# Patient Record
Sex: MALE | Race: BLACK OR AFRICAN AMERICAN | NOT HISPANIC OR LATINO | URBAN - METROPOLITAN AREA
[De-identification: names, ages, dates, MRNs, and addresses within clinical notes are randomized per-mention and may not be internally consistent; named-entity substitution may affect disease eponyms.]

---

## 2023-04-05 ENCOUNTER — ANESTHESIA (OUTPATIENT)
Dept: PERIOP | Facility: HOSPITAL | Age: 48
End: 2023-04-05
Payer: COMMERCIAL

## 2023-04-05 ENCOUNTER — APPOINTMENT (OUTPATIENT)
Dept: CT IMAGING | Facility: HOSPITAL | Age: 48
End: 2023-04-05
Payer: COMMERCIAL

## 2023-04-05 ENCOUNTER — HOSPITAL ENCOUNTER (OUTPATIENT)
Facility: HOSPITAL | Age: 48
Discharge: HOME | End: 2023-04-07
Attending: EMERGENCY MEDICINE | Admitting: SURGERY
Payer: COMMERCIAL

## 2023-04-05 ENCOUNTER — ANESTHESIA EVENT (OUTPATIENT)
Dept: PERIOP | Facility: HOSPITAL | Age: 48
End: 2023-04-05
Payer: COMMERCIAL

## 2023-04-05 DIAGNOSIS — Z90.49 S/P LAPAROSCOPIC APPENDECTOMY: ICD-10-CM

## 2023-04-05 DIAGNOSIS — R11.2 NAUSEA AND VOMITING, UNSPECIFIED VOMITING TYPE: ICD-10-CM

## 2023-04-05 DIAGNOSIS — K35.30 ACUTE APPENDICITIS WITH LOCALIZED PERITONITIS, WITHOUT PERFORATION, ABSCESS, OR GANGRENE: Primary | ICD-10-CM

## 2023-04-05 PROBLEM — K35.80 ACUTE APPENDICITIS: Status: ACTIVE | Noted: 2023-04-05

## 2023-04-05 LAB
ALBUMIN SERPL-MCNC: 4.4 G/DL (ref 3.5–5.2)
ALBUMIN/GLOB SERPL: 1.2 G/DL
ALP SERPL-CCNC: 49 U/L (ref 39–117)
ALT SERPL W P-5'-P-CCNC: 19 U/L (ref 1–41)
ANION GAP SERPL CALCULATED.3IONS-SCNC: 11.1 MMOL/L (ref 5–15)
AST SERPL-CCNC: 23 U/L (ref 1–40)
BACTERIA UR QL AUTO: ABNORMAL /HPF
BASOPHILS # BLD AUTO: 0.03 10*3/MM3 (ref 0–0.2)
BASOPHILS NFR BLD AUTO: 0.1 % (ref 0–1.5)
BILIRUB SERPL-MCNC: 0.8 MG/DL (ref 0–1.2)
BILIRUB UR QL STRIP: NEGATIVE
BUN SERPL-MCNC: 11 MG/DL (ref 6–20)
BUN/CREAT SERPL: 10.1 (ref 7–25)
CALCIUM SPEC-SCNC: 9.3 MG/DL (ref 8.6–10.5)
CHLORIDE SERPL-SCNC: 97 MMOL/L (ref 98–107)
CLARITY UR: CLEAR
CO2 SERPL-SCNC: 25.9 MMOL/L (ref 22–29)
COLOR UR: YELLOW
CREAT SERPL-MCNC: 1.09 MG/DL (ref 0.76–1.27)
DEPRECATED RDW RBC AUTO: 40 FL (ref 37–54)
EGFRCR SERPLBLD CKD-EPI 2021: 84.2 ML/MIN/1.73
EOSINOPHIL # BLD AUTO: 0 10*3/MM3 (ref 0–0.4)
EOSINOPHIL NFR BLD AUTO: 0 % (ref 0.3–6.2)
ERYTHROCYTE [DISTWIDTH] IN BLOOD BY AUTOMATED COUNT: 11.9 % (ref 12.3–15.4)
GLOBULIN UR ELPH-MCNC: 3.8 GM/DL
GLUCOSE SERPL-MCNC: 141 MG/DL (ref 65–99)
GLUCOSE UR STRIP-MCNC: NEGATIVE MG/DL
HCT VFR BLD AUTO: 38.1 % (ref 37.5–51)
HGB BLD-MCNC: 13.2 G/DL (ref 13–17.7)
HGB UR QL STRIP.AUTO: ABNORMAL
HOLD SPECIMEN: NORMAL
HOLD SPECIMEN: NORMAL
HYALINE CASTS UR QL AUTO: ABNORMAL /LPF
IMM GRANULOCYTES # BLD AUTO: 0.1 10*3/MM3 (ref 0–0.05)
IMM GRANULOCYTES NFR BLD AUTO: 0.5 % (ref 0–0.5)
KETONES UR QL STRIP: NEGATIVE
LEUKOCYTE ESTERASE UR QL STRIP.AUTO: NEGATIVE
LIPASE SERPL-CCNC: 28 U/L (ref 13–60)
LYMPHOCYTES # BLD AUTO: 1 10*3/MM3 (ref 0.7–3.1)
LYMPHOCYTES NFR BLD AUTO: 5 % (ref 19.6–45.3)
MCH RBC QN AUTO: 31.6 PG (ref 26.6–33)
MCHC RBC AUTO-ENTMCNC: 34.6 G/DL (ref 31.5–35.7)
MCV RBC AUTO: 91.1 FL (ref 79–97)
MONOCYTES # BLD AUTO: 1.52 10*3/MM3 (ref 0.1–0.9)
MONOCYTES NFR BLD AUTO: 7.6 % (ref 5–12)
NEUTROPHILS NFR BLD AUTO: 17.47 10*3/MM3 (ref 1.7–7)
NEUTROPHILS NFR BLD AUTO: 86.8 % (ref 42.7–76)
NITRITE UR QL STRIP: NEGATIVE
NRBC BLD AUTO-RTO: 0 /100 WBC (ref 0–0.2)
PH UR STRIP.AUTO: 7 [PH] (ref 5–8)
PLATELET # BLD AUTO: 272 10*3/MM3 (ref 140–450)
PMV BLD AUTO: 10 FL (ref 6–12)
POTASSIUM SERPL-SCNC: 3.9 MMOL/L (ref 3.5–5.2)
PROT SERPL-MCNC: 8.2 G/DL (ref 6–8.5)
PROT UR QL STRIP: NEGATIVE
RBC # BLD AUTO: 4.18 10*6/MM3 (ref 4.14–5.8)
RBC # UR STRIP: ABNORMAL /HPF
REF LAB TEST METHOD: ABNORMAL
SODIUM SERPL-SCNC: 134 MMOL/L (ref 136–145)
SP GR UR STRIP: >1.03 (ref 1–1.03)
SQUAMOUS #/AREA URNS HPF: ABNORMAL /HPF
UROBILINOGEN UR QL STRIP: ABNORMAL
WBC # UR STRIP: ABNORMAL /HPF
WBC NRBC COR # BLD: 20.12 10*3/MM3 (ref 3.4–10.8)
WHOLE BLOOD HOLD COAG: NORMAL
WHOLE BLOOD HOLD SPECIMEN: NORMAL

## 2023-04-05 PROCEDURE — 74177 CT ABD & PELVIS W/CONTRAST: CPT

## 2023-04-05 PROCEDURE — 44970 LAPAROSCOPY APPENDECTOMY: CPT | Performed by: SURGERY

## 2023-04-05 PROCEDURE — 96365 THER/PROPH/DIAG IV INF INIT: CPT

## 2023-04-05 PROCEDURE — 25010000002 ONDANSETRON PER 1 MG: Performed by: NURSE PRACTITIONER

## 2023-04-05 PROCEDURE — 44970 LAPAROSCOPY APPENDECTOMY: CPT | Performed by: SPECIALIST/TECHNOLOGIST, OTHER

## 2023-04-05 PROCEDURE — 96361 HYDRATE IV INFUSION ADD-ON: CPT

## 2023-04-05 PROCEDURE — 0 HYDROMORPHONE 1 MG/ML SOLUTION: Performed by: NURSE ANESTHETIST, CERTIFIED REGISTERED

## 2023-04-05 PROCEDURE — 25010000002 MORPHINE PER 10 MG: Performed by: EMERGENCY MEDICINE

## 2023-04-05 PROCEDURE — 25010000002 MORPHINE PER 10 MG: Performed by: SURGERY

## 2023-04-05 PROCEDURE — 96375 TX/PRO/DX INJ NEW DRUG ADDON: CPT

## 2023-04-05 PROCEDURE — 25010000002 PROPOFOL 10 MG/ML EMULSION: Performed by: NURSE ANESTHETIST, CERTIFIED REGISTERED

## 2023-04-05 PROCEDURE — 25010000002 FENTANYL CITRATE (PF) 50 MCG/ML SOLUTION: Performed by: NURSE ANESTHETIST, CERTIFIED REGISTERED

## 2023-04-05 PROCEDURE — 99221 1ST HOSP IP/OBS SF/LOW 40: CPT | Performed by: NURSE PRACTITIONER

## 2023-04-05 PROCEDURE — 81001 URINALYSIS AUTO W/SCOPE: CPT | Performed by: EMERGENCY MEDICINE

## 2023-04-05 PROCEDURE — 88304 TISSUE EXAM BY PATHOLOGIST: CPT | Performed by: SURGERY

## 2023-04-05 PROCEDURE — 25010000002 ONDANSETRON PER 1 MG: Performed by: EMERGENCY MEDICINE

## 2023-04-05 PROCEDURE — 25510000001 IOPAMIDOL PER 1 ML: Performed by: NURSE PRACTITIONER

## 2023-04-05 PROCEDURE — 25010000002 KETOROLAC TROMETHAMINE PER 15 MG: Performed by: SURGERY

## 2023-04-05 PROCEDURE — 25010000002 CEFOXITIN PER 1 G: Performed by: SURGERY

## 2023-04-05 PROCEDURE — 85025 COMPLETE CBC W/AUTO DIFF WBC: CPT | Performed by: EMERGENCY MEDICINE

## 2023-04-05 PROCEDURE — 99284 EMERGENCY DEPT VISIT MOD MDM: CPT

## 2023-04-05 PROCEDURE — 96376 TX/PRO/DX INJ SAME DRUG ADON: CPT

## 2023-04-05 PROCEDURE — 83690 ASSAY OF LIPASE: CPT | Performed by: EMERGENCY MEDICINE

## 2023-04-05 PROCEDURE — 80053 COMPREHEN METABOLIC PANEL: CPT | Performed by: EMERGENCY MEDICINE

## 2023-04-05 PROCEDURE — 25010000002 DEXAMETHASONE PER 1 MG: Performed by: NURSE ANESTHETIST, CERTIFIED REGISTERED

## 2023-04-05 PROCEDURE — 25010000002 PIPERACILLIN SOD-TAZOBACTAM PER 1 G: Performed by: EMERGENCY MEDICINE

## 2023-04-05 PROCEDURE — 25010000002 ONDANSETRON PER 1 MG: Performed by: SURGERY

## 2023-04-05 DEVICE — LIGACLIP 10-M/L, 10MM ENDOSCOPIC ROTATING MULTIPLE CLIP APPLIERS
Type: IMPLANTABLE DEVICE | Site: ABDOMEN | Status: FUNCTIONAL
Brand: LIGACLIP

## 2023-04-05 DEVICE — ENDOPATH ECHELON ENDOSCOPIC LINEAR CUTTER RELOADS, BLUE, 60MM
Type: IMPLANTABLE DEVICE | Site: ABDOMEN | Status: FUNCTIONAL
Brand: ECHELON ENDOPATH

## 2023-04-05 RX ORDER — ONDANSETRON 2 MG/ML
4 INJECTION INTRAMUSCULAR; INTRAVENOUS ONCE
Status: DISCONTINUED | OUTPATIENT
Start: 2023-04-05 | End: 2023-04-05 | Stop reason: SDUPTHER

## 2023-04-05 RX ORDER — ACETAMINOPHEN 500 MG
500 TABLET ORAL EVERY 4 HOURS PRN
Status: DISCONTINUED | OUTPATIENT
Start: 2023-04-05 | End: 2023-04-07 | Stop reason: HOSPADM

## 2023-04-05 RX ORDER — LIDOCAINE HYDROCHLORIDE 20 MG/ML
INJECTION, SOLUTION EPIDURAL; INFILTRATION; INTRACAUDAL; PERINEURAL AS NEEDED
Status: DISCONTINUED | OUTPATIENT
Start: 2023-04-05 | End: 2023-04-05 | Stop reason: SURG

## 2023-04-05 RX ORDER — BUPIVACAINE HYDROCHLORIDE 2.5 MG/ML
INJECTION, SOLUTION EPIDURAL; INFILTRATION; INTRACAUDAL AS NEEDED
Status: DISCONTINUED | OUTPATIENT
Start: 2023-04-05 | End: 2023-04-05 | Stop reason: HOSPADM

## 2023-04-05 RX ORDER — KETOROLAC TROMETHAMINE 30 MG/ML
15 INJECTION, SOLUTION INTRAMUSCULAR; INTRAVENOUS EVERY 6 HOURS PRN
Status: DISCONTINUED | OUTPATIENT
Start: 2023-04-05 | End: 2023-04-07 | Stop reason: HOSPADM

## 2023-04-05 RX ORDER — SODIUM CHLORIDE, SODIUM LACTATE, POTASSIUM CHLORIDE, CALCIUM CHLORIDE 600; 310; 30; 20 MG/100ML; MG/100ML; MG/100ML; MG/100ML
30 INJECTION, SOLUTION INTRAVENOUS CONTINUOUS
Status: DISCONTINUED | OUTPATIENT
Start: 2023-04-05 | End: 2023-04-05

## 2023-04-05 RX ORDER — MAGNESIUM HYDROXIDE 1200 MG/15ML
LIQUID ORAL AS NEEDED
Status: DISCONTINUED | OUTPATIENT
Start: 2023-04-05 | End: 2023-04-05 | Stop reason: HOSPADM

## 2023-04-05 RX ORDER — HYDROCODONE BITARTRATE AND ACETAMINOPHEN 5; 325 MG/1; MG/1
1 TABLET ORAL EVERY 4 HOURS PRN
Status: DISCONTINUED | OUTPATIENT
Start: 2023-04-05 | End: 2023-04-07 | Stop reason: HOSPADM

## 2023-04-05 RX ORDER — ONDANSETRON 2 MG/ML
4 INJECTION INTRAMUSCULAR; INTRAVENOUS EVERY 6 HOURS PRN
Status: DISCONTINUED | OUTPATIENT
Start: 2023-04-05 | End: 2023-04-07 | Stop reason: HOSPADM

## 2023-04-05 RX ORDER — SODIUM CHLORIDE, SODIUM LACTATE, POTASSIUM CHLORIDE, CALCIUM CHLORIDE 600; 310; 30; 20 MG/100ML; MG/100ML; MG/100ML; MG/100ML
75 INJECTION, SOLUTION INTRAVENOUS CONTINUOUS
Status: DISCONTINUED | OUTPATIENT
Start: 2023-04-05 | End: 2023-04-07 | Stop reason: HOSPADM

## 2023-04-05 RX ORDER — PROMETHAZINE HYDROCHLORIDE 12.5 MG/1
25 TABLET ORAL ONCE AS NEEDED
Status: DISCONTINUED | OUTPATIENT
Start: 2023-04-05 | End: 2023-04-05 | Stop reason: HOSPADM

## 2023-04-05 RX ORDER — ONDANSETRON 2 MG/ML
4 INJECTION INTRAMUSCULAR; INTRAVENOUS ONCE AS NEEDED
Status: DISCONTINUED | OUTPATIENT
Start: 2023-04-05 | End: 2023-04-05 | Stop reason: HOSPADM

## 2023-04-05 RX ORDER — PANTOPRAZOLE SODIUM 40 MG/1
40 TABLET, DELAYED RELEASE ORAL 2 TIMES DAILY PRN
Status: DISCONTINUED | OUTPATIENT
Start: 2023-04-05 | End: 2023-04-07 | Stop reason: HOSPADM

## 2023-04-05 RX ORDER — PROPOFOL 10 MG/ML
VIAL (ML) INTRAVENOUS AS NEEDED
Status: DISCONTINUED | OUTPATIENT
Start: 2023-04-05 | End: 2023-04-05 | Stop reason: SURG

## 2023-04-05 RX ORDER — DEXAMETHASONE SODIUM PHOSPHATE 4 MG/ML
INJECTION, SOLUTION INTRA-ARTICULAR; INTRALESIONAL; INTRAMUSCULAR; INTRAVENOUS; SOFT TISSUE AS NEEDED
Status: DISCONTINUED | OUTPATIENT
Start: 2023-04-05 | End: 2023-04-05 | Stop reason: SURG

## 2023-04-05 RX ORDER — FENTANYL CITRATE 50 UG/ML
INJECTION, SOLUTION INTRAMUSCULAR; INTRAVENOUS AS NEEDED
Status: DISCONTINUED | OUTPATIENT
Start: 2023-04-05 | End: 2023-04-05 | Stop reason: SURG

## 2023-04-05 RX ORDER — ONDANSETRON 2 MG/ML
4 INJECTION INTRAMUSCULAR; INTRAVENOUS ONCE
Status: COMPLETED | OUTPATIENT
Start: 2023-04-05 | End: 2023-04-05

## 2023-04-05 RX ORDER — MORPHINE SULFATE 2 MG/ML
2 INJECTION, SOLUTION INTRAMUSCULAR; INTRAVENOUS ONCE
Status: COMPLETED | OUTPATIENT
Start: 2023-04-05 | End: 2023-04-05

## 2023-04-05 RX ORDER — DIPHENHYDRAMINE HCL 25 MG
25 CAPSULE ORAL EVERY 8 HOURS PRN
Status: DISCONTINUED | OUTPATIENT
Start: 2023-04-05 | End: 2023-04-07 | Stop reason: HOSPADM

## 2023-04-05 RX ORDER — ESMOLOL HYDROCHLORIDE 10 MG/ML
INJECTION INTRAVENOUS AS NEEDED
Status: DISCONTINUED | OUTPATIENT
Start: 2023-04-05 | End: 2023-04-05 | Stop reason: SURG

## 2023-04-05 RX ORDER — FAMOTIDINE 10 MG/ML
20 INJECTION, SOLUTION INTRAVENOUS ONCE
Status: COMPLETED | OUTPATIENT
Start: 2023-04-05 | End: 2023-04-05

## 2023-04-05 RX ORDER — MEPERIDINE HYDROCHLORIDE 25 MG/ML
12.5 INJECTION INTRAMUSCULAR; INTRAVENOUS; SUBCUTANEOUS
Status: DISCONTINUED | OUTPATIENT
Start: 2023-04-05 | End: 2023-04-05 | Stop reason: HOSPADM

## 2023-04-05 RX ORDER — PROMETHAZINE HYDROCHLORIDE 25 MG/1
25 SUPPOSITORY RECTAL ONCE AS NEEDED
Status: DISCONTINUED | OUTPATIENT
Start: 2023-04-05 | End: 2023-04-05 | Stop reason: HOSPADM

## 2023-04-05 RX ORDER — MORPHINE SULFATE 2 MG/ML
2 INJECTION, SOLUTION INTRAMUSCULAR; INTRAVENOUS
Status: DISCONTINUED | OUTPATIENT
Start: 2023-04-05 | End: 2023-04-07 | Stop reason: HOSPADM

## 2023-04-05 RX ORDER — SODIUM CHLORIDE 0.9 % (FLUSH) 0.9 %
10 SYRINGE (ML) INJECTION AS NEEDED
Status: DISCONTINUED | OUTPATIENT
Start: 2023-04-05 | End: 2023-04-07 | Stop reason: HOSPADM

## 2023-04-05 RX ORDER — SODIUM CHLORIDE, SODIUM LACTATE, POTASSIUM CHLORIDE, CALCIUM CHLORIDE 600; 310; 30; 20 MG/100ML; MG/100ML; MG/100ML; MG/100ML
100 INJECTION, SOLUTION INTRAVENOUS CONTINUOUS
Status: DISCONTINUED | OUTPATIENT
Start: 2023-04-05 | End: 2023-04-05

## 2023-04-05 RX ORDER — ROCURONIUM BROMIDE 10 MG/ML
INJECTION, SOLUTION INTRAVENOUS AS NEEDED
Status: DISCONTINUED | OUTPATIENT
Start: 2023-04-05 | End: 2023-04-05 | Stop reason: SURG

## 2023-04-05 RX ORDER — OXYCODONE HYDROCHLORIDE 5 MG/1
5 TABLET ORAL
Status: DISCONTINUED | OUTPATIENT
Start: 2023-04-05 | End: 2023-04-05 | Stop reason: HOSPADM

## 2023-04-05 RX ADMIN — FENTANYL CITRATE 50 MCG: 50 INJECTION, SOLUTION INTRAMUSCULAR; INTRAVENOUS at 16:05

## 2023-04-05 RX ADMIN — LIDOCAINE HYDROCHLORIDE 100 MG: 20 INJECTION, SOLUTION EPIDURAL; INFILTRATION; INTRACAUDAL; PERINEURAL at 15:59

## 2023-04-05 RX ADMIN — HYDROMORPHONE HYDROCHLORIDE 1 MG: 1 INJECTION, SOLUTION INTRAMUSCULAR; INTRAVENOUS; SUBCUTANEOUS at 16:32

## 2023-04-05 RX ADMIN — ROCURONIUM BROMIDE 50 MG: 10 INJECTION, SOLUTION INTRAVENOUS at 15:59

## 2023-04-05 RX ADMIN — ONDANSETRON 4 MG: 2 INJECTION INTRAMUSCULAR; INTRAVENOUS at 16:40

## 2023-04-05 RX ADMIN — SODIUM CHLORIDE, POTASSIUM CHLORIDE, SODIUM LACTATE AND CALCIUM CHLORIDE 75 ML/HR: 600; 310; 30; 20 INJECTION, SOLUTION INTRAVENOUS at 19:24

## 2023-04-05 RX ADMIN — FAMOTIDINE 20 MG: 10 INJECTION INTRAVENOUS at 05:00

## 2023-04-05 RX ADMIN — SUGAMMADEX 200 MG: 100 INJECTION, SOLUTION INTRAVENOUS at 16:46

## 2023-04-05 RX ADMIN — SODIUM CHLORIDE 1000 ML: 9 INJECTION, SOLUTION INTRAVENOUS at 05:00

## 2023-04-05 RX ADMIN — ONDANSETRON 4 MG: 2 INJECTION INTRAMUSCULAR; INTRAVENOUS at 05:00

## 2023-04-05 RX ADMIN — IOPAMIDOL 100 ML: 755 INJECTION, SOLUTION INTRAVENOUS at 05:50

## 2023-04-05 RX ADMIN — KETOROLAC TROMETHAMINE 30 MG: 30 INJECTION, SOLUTION INTRAMUSCULAR; INTRAVENOUS at 16:40

## 2023-04-05 RX ADMIN — SODIUM CHLORIDE 2 G: 900 INJECTION INTRAVENOUS at 14:46

## 2023-04-05 RX ADMIN — DEXAMETHASONE SODIUM PHOSPHATE 4 MG: 4 INJECTION, SOLUTION INTRA-ARTICULAR; INTRALESIONAL; INTRAMUSCULAR; INTRAVENOUS; SOFT TISSUE at 16:20

## 2023-04-05 RX ADMIN — PROPOFOL 200 MG: 10 INJECTION, EMULSION INTRAVENOUS at 15:59

## 2023-04-05 RX ADMIN — ONDANSETRON 4 MG: 2 INJECTION INTRAMUSCULAR; INTRAVENOUS at 06:40

## 2023-04-05 RX ADMIN — SODIUM CHLORIDE 2 G: 900 INJECTION INTRAVENOUS at 09:59

## 2023-04-05 RX ADMIN — SODIUM CHLORIDE, POTASSIUM CHLORIDE, SODIUM LACTATE AND CALCIUM CHLORIDE: 600; 310; 30; 20 INJECTION, SOLUTION INTRAVENOUS at 15:56

## 2023-04-05 RX ADMIN — MORPHINE SULFATE 2 MG: 2 INJECTION, SOLUTION INTRAMUSCULAR; INTRAVENOUS at 06:28

## 2023-04-05 RX ADMIN — ESMOLOL HYDROCHLORIDE 10 MG: 10 INJECTION INTRAVENOUS at 16:14

## 2023-04-05 RX ADMIN — SODIUM CHLORIDE, POTASSIUM CHLORIDE, SODIUM LACTATE AND CALCIUM CHLORIDE 100 ML/HR: 600; 310; 30; 20 INJECTION, SOLUTION INTRAVENOUS at 09:22

## 2023-04-05 RX ADMIN — TAZOBACTAM SODIUM AND PIPERACILLIN SODIUM 3.38 G: 375; 3 INJECTION, SOLUTION INTRAVENOUS at 06:30

## 2023-04-05 RX ADMIN — DEXAMETHASONE SODIUM PHOSPHATE 4 MG: 4 INJECTION, SOLUTION INTRA-ARTICULAR; INTRALESIONAL; INTRAMUSCULAR; INTRAVENOUS; SOFT TISSUE at 16:03

## 2023-04-05 RX ADMIN — FENTANYL CITRATE 50 MCG: 50 INJECTION, SOLUTION INTRAMUSCULAR; INTRAVENOUS at 15:59

## 2023-04-05 RX ADMIN — MORPHINE SULFATE 2 MG: 2 INJECTION, SOLUTION INTRAMUSCULAR; INTRAVENOUS at 08:22

## 2023-04-05 NOTE — OP NOTE
Operative Report    Patient Name:  Jamil Deutsch  YOB: 1975    Date of Surgery:  4/5/2023     Pre-op Diagnosis:   Acute appendicitis with localized peritonitis, without perforation, abscess, or gangrene [K35.30]       Post-op Diagnosis:   Post-Op Diagnosis Codes:     * Acute appendicitis with localized peritonitis, without perforation, abscess, or gangrene [K35.30]    Procedure(s):  APPENDECTOMY LAPAROSCOPIC    Staff:  Surgeon(s):  Baldev Berumen MD    Assistant: Parker Neff CSA    Anesthesia: General    Estimated Blood Loss: 10 mL    Complications:  None    Drains:  None    Packing:  None    Implants:    Implant Name Type Inv. Item Serial No.  Lot No. LRB No. Used Action   RELOAD ECHELON FLEX GST REG 3.6MM JOSE - [id number] Implant RELOAD ECHELON FLEX GST REG 3.6MM JOSE  [employer]  DIV OF J AND J [id number] N/A 1 Implanted   CLIPAPPLR M/ ENDO LIGACLIP ROT 10MM MD/LG - [id number] Implant CLIPAPPLR M/ ENDO LIGACLIP ROT 10MM MD/LG  [employer]  DIV OF J AND J [id number] N/A 1 Implanted       Specimen:          Specimens     ID Source Type Tests Collected By Collected At Frozen?    A Large Intestine, Appendix Tissue · TISSUE PATHOLOGY EXAM   Baldev Berumen MD 4/5/23 8510     Description: Appendix    This specimen was not marked as sent.        Indications: 47-year-old male who presented to the emergency room earlier today with signs and symptoms consistent with acute appendicitis.     Findings:  Appendix acutely inflamed.  Inflammatory exudate present on the tip and body of the appendix with a possible small perforation at the tip of the appendix.    Description of Procedure: The patient was taken to the operating room placed supine on the operative table.  Timeout was performed.  General anesthesia was administered.  The abdomen was prepped and draped in sterile fashion.  Using my standard technique with the Veress needle to establish pneumoperitoneum and my standard  3 cannula sizes and locations on the abdominal wall, pneumoperitoneum was established and 3 cannulas were placed.  A laparoscope was inserted.  The abdomen was explored.  There was no evidence of any injury to any intra-abdominal structures from using the Versess needle or from placing the cannulas, and there was no unexpected intra-abdominal pathology not related to the appendix.  The patient was positioned head down and rotated toward the left side.  Attention was focused at the right lower quadrant.  The appendix was markedly acutely inflamed and there was inflammatory exudate present on the body and tip of the appendix.  There was no clear visible perforation but there was an area at the tip of the appendix concerning for a small perforation.  Nevertheless, there was no abscess or any obvious evidence of perforation.  The mesentery of the appendix was divided with the LigaSure and then the base of the appendix was divided flush with the cecum using a blue load of the endoscopic linear cutting stapler.  The appendix was placed in an Endo Catch bag and removed from the abdomen and sent to pathology.  The staple line was examined.  It was intact.  There was adequate hemostasis.  The right lower quadrant and pelvis was irrigated with sterile fluid and the irrigant was suctioned.  The patient was positioned flat.  The 12 mm cannula was removed.  The fascial incision created to place the 12 mm cannula was closed with an 0-Vicryl suture using the suture passer.  Pneumoperitoneum was released and all of the laparoscopic equipment was removed.  The skin incisions were closed with buried absorbable suture followed by appropriate dressings.  There were no complications.  The patient did well during the procedure and was transported to the recovery area in stable condition.  Sponge needle and instrument counts were correct.    Assistant: Parker Neff CSA was responsible for performing the following activities:   operating the laparoscope during the surgery, closing, and placing dressings, and his skilled assistance was necessary for the success of this case.    Baldev Berumen MD     Date: 4/5/2023  Time: 17:00 EDT    Electronically signed by Baldev Berumen MD, 04/05/23, 5:00 PM EDT.

## 2023-04-05 NOTE — ED PROVIDER NOTES
Time: 4:55 AM EDT  Date of encounter:  4/5/2023  Independent Historian/Clinical History and Information was obtained by:   Patient  Chief Complaint   Patient presents with   • Abdominal Pain   • Vomiting       History is limited by: N/A    History of Present Illness:  Patient is a 47 y.o. year old male who presents to the emergency department for evaluation of abdominal pain, nausea, vomiting onset yesterday. Patient denies diarrhea, fevers, chest pain, shortness of breath. Patient denies any medical hx.       History provided by:  Patient   used: No        Patient Care Team  Primary Care Provider: Keith Matos MD    Past Medical History:     No Known Allergies  History reviewed. No pertinent past medical history.  Past Surgical History:   Procedure Laterality Date   • NO PAST SURGERIES       Family History   Problem Relation Age of Onset   • No Known Problems Mother    • No Known Problems Father        Home Medications:  Prior to Admission medications    Not on File        Social History:   Social History     Tobacco Use   • Smoking status: Never   Vaping Use   • Vaping Use: Never used   Substance Use Topics   • Alcohol use: Never   • Drug use: Never         Review of Systems:  Review of Systems   Constitutional: Negative for chills and fever.   HENT: Negative for congestion, rhinorrhea and sore throat.    Eyes: Negative for photophobia.   Respiratory: Negative for apnea, cough, chest tightness and shortness of breath.    Cardiovascular: Negative for chest pain and palpitations.   Gastrointestinal: Positive for abdominal pain, nausea and vomiting. Negative for diarrhea.   Endocrine: Negative.    Genitourinary: Negative for difficulty urinating and dysuria.   Musculoskeletal: Negative for back pain, joint swelling and myalgias.   Skin: Negative for color change and wound.   Allergic/Immunologic: Negative.    Neurological: Negative for seizures and headaches.   Psychiatric/Behavioral: Negative.   "  All other systems reviewed and are negative.       Physical Exam:  /89 (BP Location: Left arm, Patient Position: Lying)   Pulse 72   Temp 98.4 °F (36.9 °C) (Oral)   Resp 18   Ht 188 cm (74.02\")   Wt 110 kg (242 lb 8.1 oz)   SpO2 94%   BMI 31.12 kg/m²     Physical Exam  Vitals and nursing note reviewed.   Constitutional:       General: He is not in acute distress.     Appearance: Normal appearance. He is not toxic-appearing.   HENT:      Head: Normocephalic and atraumatic.      Mouth/Throat:      Mouth: Mucous membranes are moist.   Eyes:      General: No scleral icterus.  Cardiovascular:      Rate and Rhythm: Normal rate and regular rhythm.      Pulses: Normal pulses.           Radial pulses are 2+ on the right side and 2+ on the left side.      Heart sounds: Normal heart sounds. No murmur heard.    No friction rub.   Pulmonary:      Effort: Pulmonary effort is normal. No respiratory distress.      Breath sounds: Normal breath sounds. No decreased breath sounds, wheezing, rhonchi or rales.   Abdominal:      General: Abdomen is flat. Bowel sounds are normal. There is no distension.      Palpations: Abdomen is soft.      Tenderness: There is abdominal tenderness (moderate diffused, severe RLQ) in the right lower quadrant. There is no guarding or rebound.   Musculoskeletal:         General: Normal range of motion.      Cervical back: Normal range of motion and neck supple.      Right lower leg: No edema.      Left lower leg: No edema.   Skin:     General: Skin is warm and dry.   Neurological:      Mental Status: He is alert and oriented to person, place, and time. Mental status is at baseline.                  Procedures:  Procedures      Medical Decision Making:      Comorbidities that affect care:    None    External Notes reviewed:    None      The following orders were placed and all results were independently analyzed by me:  Orders Placed This Encounter   Procedures   • CT Abdomen Pelvis With " Contrast   • [place] Draw   • Comprehensive Metabolic Panel   • Lipase   • Urinalysis With Microscopic If Indicated (No Culture) - Urine, Clean Catch   • CBC Auto Differential   • Urinalysis, Microscopic Only - Urine, Clean Catch   • NPO Diet NPO Type: Strict NPO   • Undress & Gown   • Intake & Output   • Vital Signs   • Place Sequential Compression Device   • Maintain Sequential Compression Device   • Activity As Tolerated   • Verify / Perform Chlorhexidine Skin Prep   • Verify / Perform Chlorhexidine Skin Prep if Indicated (If Not Already Completed)   • Perform Chlorhexidine Skin Prep Night Before and Morning of Procedure   • Obtain Informed Consent   • Code Status and Medical Interventions:   • Surgery (on-call MD unless specified)   • Insert Peripheral IV   • Initiate Observation Status   • Outpatient In A Bed   • CBC & Differential   • Green Top (Gel)   • Lavender Top   • Gold Top - SST   • Light Blue Top       Medications Given in the Emergency Department:  Medications   sodium chloride 0.9 % flush 10 mL (has no administration in time range)   morphine injection 2 mg (2 mg Intravenous Given 4/5/23 0822)   ketorolac (TORADOL) injection 15 mg (has no administration in time range)   ondansetron (ZOFRAN) injection 4 mg (has no administration in time range)   lactated ringers infusion (100 mL/hr Intravenous New Bag 4/5/23 0922)   ceFOXitin (MEFOXIN) 2 g/100 mL NS (MBP) (0 g Intravenous Stopped 4/5/23 1457)   sodium chloride 0.9 % bolus 1,000 mL (0 mL Intravenous Stopped 4/5/23 0600)   ondansetron (ZOFRAN) injection 4 mg (4 mg Intravenous Given 4/5/23 0500)   famotidine (PEPCID) injection 20 mg (20 mg Intravenous Given 4/5/23 0500)   iopamidol (ISOVUE-370) 76 % injection 100 mL (100 mL Intravenous Given 4/5/23 0550)   morphine injection 2 mg (2 mg Intravenous Given 4/5/23 0628)   piperacillin-tazobactam (ZOSYN) 3.375 g in iso-osmotic dextrose 50 ml (premix) (0 g Intravenous Stopped 4/5/23 0700)   ondansetron  (ZOFRAN) injection 4 mg (4 mg Intravenous Given 4/5/23 0640)        ED Course:    The patient was initially evaluated in the triage area where orders were placed. The patient was later dispositioned by Bobby Be MD.      The patient was advised to stay for completion of workup which includes but is not limited to communication of labs and radiological results, reassessment and plan. The patient was advised that leaving prior to disposition by a provider could result in critical findings that are not communicated to the patient.          Labs:    Lab Results (last 24 hours)     Procedure Component Value Units Date/Time    CBC & Differential [[id number]]  (Abnormal) Collected: 04/05/23 0455    Specimen: Blood Updated: 04/05/23 0508    Narrative:      The following orders were created for panel order CBC & Differential.  Procedure                               Abnormality         Status                     ---------                               -----------         ------                     CBC Auto Differential[[id number]]        Abnormal            Final result                 Please view results for these tests on the individual orders.    Comprehensive Metabolic Panel [[id number]]  (Abnormal) Collected: 04/05/23 0455    Specimen: Blood Updated: 04/05/23 0525     Glucose 141 mg/dL      BUN 11 mg/dL      Creatinine 1.09 mg/dL      Sodium 134 mmol/L      Potassium 3.9 mmol/L      Comment: Slight hemolysis detected by analyzer. Results may be affected.        Chloride 97 mmol/L      CO2 25.9 mmol/L      Calcium 9.3 mg/dL      Total Protein 8.2 g/dL      Albumin 4.4 g/dL      ALT (SGPT) 19 U/L      AST (SGOT) 23 U/L      Alkaline Phosphatase 49 U/L      Total Bilirubin 0.8 mg/dL      Globulin 3.8 gm/dL      A/G Ratio 1.2 g/dL      BUN/Creatinine Ratio 10.1     Anion Gap 11.1 mmol/L      eGFR 84.2 mL/min/1.73     Narrative:      GFR Normal >60  Chronic Kidney Disease <60  Kidney Failure <15      Lipase [[id number]]   (Normal) Collected: 04/05/23 0455    Specimen: Blood Updated: 04/05/23 0525     Lipase 28 U/L     CBC Auto Differential [[id number]]  (Abnormal) Collected: 04/05/23 0455    Specimen: Blood Updated: 04/05/23 0508     WBC 20.12 10*3/mm3      RBC 4.18 10*6/mm3      Hemoglobin 13.2 g/dL      Hematocrit 38.1 %      MCV 91.1 fL      MCH 31.6 pg      MCHC 34.6 g/dL      RDW 11.9 %      RDW-SD 40.0 fl      MPV 10.0 fL      Platelets 272 10*3/mm3      Neutrophil % 86.8 %      Lymphocyte % 5.0 %      Monocyte % 7.6 %      Eosinophil % 0.0 %      Basophil % 0.1 %      Immature Grans % 0.5 %      Neutrophils, Absolute 17.47 10*3/mm3      Lymphocytes, Absolute 1.00 10*3/mm3      Monocytes, Absolute 1.52 10*3/mm3      Eosinophils, Absolute 0.00 10*3/mm3      Basophils, Absolute 0.03 10*3/mm3      Immature Grans, Absolute 0.10 10*3/mm3      nRBC 0.0 /100 WBC     Urinalysis With Microscopic If Indicated (No Culture) - Urine, Clean Catch [[id number]]  (Abnormal) Collected: 04/05/23 0611    Specimen: Urine, Clean Catch Updated: 04/05/23 0622     Color, UA Yellow     Appearance, UA Clear     pH, UA 7.0     Specific Gravity, UA >1.030     Glucose, UA Negative     Ketones, UA Negative     Bilirubin, UA Negative     Blood, UA Trace     Protein, UA Negative     Leuk Esterase, UA Negative     Nitrite, UA Negative     Urobilinogen, UA 1.0 E.U./dL    Urinalysis, Microscopic Only - Urine, Clean Catch [[id number]]  (Abnormal) Collected: 04/05/23 0611    Specimen: Urine, Clean Catch Updated: 04/05/23 0622     RBC, UA 6-12 /HPF      WBC, UA 0-2 /HPF      Bacteria, UA None Seen /HPF      Squamous Epithelial Cells, UA 0-2 /HPF      Hyaline Casts, UA None Seen /LPF      Methodology Automated Microscopy           Imaging:    CT Abdomen Pelvis With Contrast    Result Date: 4/5/2023  PROCEDURE: CT ABDOMEN PELVIS W CONTRAST  COMPARISON: None.  INDICATIONS: MID ABDOMEN PAIN AND VOMITING.  TECHNIQUE: After obtaining the patient's consent, 664 CT images were  created with non-ionic intravenous contrast material.   PROTOCOL:   Standard CT imaging protocol performed.    RADIATION:   Total DLP: 1,003.5 mGy*cm   Automated exposure control was utilized to minimize radiation dose. CONTRAST: 100 mL Isovue 370 I.V.  FINDINGS: The study is abnormal.  CT findings are most suggestive of acute appendicitis.  No pneumoperitoneum or pneumatosis is seen.  No mechanical bowel obstruction.  The appendix is dilated with surrounding inflammation; it resides within the right iliac fossa.  It arises from the medial, inferior, and posterior aspects of the cecum at about the level of the superior right iliac crest and courses medially, inferiorly, and posteriorly into the right iliac fossa.  It has a maximum diameter of about 1.9 cm, as seen on image 86 of series 201 and adjacent images.  Minimal if any mural thickening involves the cecum and the terminal ileum.  There may be incidental tiny hepatic cysts, which measure about 1 cm less in size.  There is diastasis of the rectus sheath with fat and bowel protruding anteriorly through the diastatic defect, such as seen on image 71 of series 201 and adjacent images.  The diastatic defect measures 3.6 cm in transverse diameter.  No acute diverticulitis.  No renal/ureteral stones or hydronephrosis or obstructive uropathy.  No acute pyelonephritis.  There is chronic calcified granulomatous disease of the spleen.  There may be mild cardiomegaly.  Mild atelectasis involves the lung bases.  No acute infiltrate is seen.  There is slight motion artifact on the study.  Degenerative changes involve the imaged spine, especially the L5-S1 level.  Also, prominent facet osteoarthritis is present on the right at L2-3.  There may be partial congenital fusion of the facet joints on the left at L2-3.  Consider nonemergent lumbar spine MRI examination for further imaging assessment if clinically warranted.       CT findings are most consistent with acute  appendicitis.  No pneumoperitoneum.  No definite periappendiceal abscess is seen.     Please note that portions of this note were completed with a voice recognition program.  TORY MANSFIELD JR, MD       Electronically Signed and Approved By: TORY MANSFIELD JR, MD on 4/05/2023 at 5:59                  Differential Diagnosis and Discussion:      Abdominal Pain: Based on the patient's signs and symptoms, I considered abdominal aortic aneurysm, small bowel obstruction, pancreatitis, acute cholecystitis, acute appendecitis, peptic ulcer disease, gastritis, colitis, endocrine disorders, irritable bowel syndrome and other differential diagnosis an etiology of the patient's abdominal pain.    All labs were reviewed and interpreted by me.  CT scan radiology interpretation was reviewed by me.    MDM               This patient is a pleasant 47-year-old male with no significant medical history who presents with severe abdominal pain and vomiting.    He was noted to have an elevated white blood cell count of 20 today, and we treated his symptoms with some IV antiemetics and pain medicine and fluids.    Given his tenderness throughout the abdomen, mostly in the right lower quadrant, we did go ahead and order a CT scan of the abdomen and pelvis with contrast.    CT imaging came back positive for acute uncomplicated appendicitis.    We are keeping the patient n.p.o. and continuing IV fluids and I will give him a dose of empiric Zosyn antibiotic and plan to admit him to our surgeon for appendectomy.                Patient Care Considerations:          Consultants/Shared Management Plan:    This case was discussed with Dr. Berumen of general surgery who agrees to admit the patient for appendicitis.    Social Determinants of Health:    Patient has presented with family members who are responsible, reliable and will ensure follow up care.      Disposition and Care Coordination:    Admit:   Through independent evaluation of the patient's  history, physical, and imperical data, the patient meets criteria for observation/admission to the hospital.        Final diagnoses:   Acute appendicitis with localized peritonitis, without perforation, abscess, or gangrene   Nausea and vomiting, unspecified vomiting type        ED Disposition     ED Disposition   Decision to Admit    Condition   --    Comment   Level of Care: Med/Surg [1]   Diagnosis: Acute appendicitis with localized peritonitis, without perforation, abscess, or gangrene [[id number]]   Admitting Physician: MARISA SEBASTIAN [[id number]]   Attending Physician: MARISA SEBASTIAN [[id number]]               This medical record created using voice recognition software.    Documentation assistance provided by Clara Elliott acting as scribe  Information recorded by the scribe was done at my direction and has been verified and validated by me.            Clara Elliott  04/05/23 0622       Clara Elliott  04/05/23 0622       Bobby Be MD  04/05/23 4836

## 2023-04-05 NOTE — ANESTHESIA POSTPROCEDURE EVALUATION
Patient: Jamil Deutsch    Procedure Summary     Date: 04/05/23 Room / Location: [facility] OR 04 / [facility] MAIN OR    Anesthesia Start: 1557 Anesthesia Stop: 1720    Procedure: APPENDECTOMY LAPAROSCOPIC (Abdomen) Diagnosis:       Acute appendicitis with localized peritonitis, without perforation, abscess, or gangrene      (Acute appendicitis with localized peritonitis, without perforation, abscess, or gangrene [K35.30])    Surgeons: Baldev Berumen MD Provider: Eduardo Best CRNA    Anesthesia Type: general ASA Status: 1          Anesthesia Type: general    Vitals  Vitals Value Taken Time   /71 04/05/23 1844   Temp 36.2 °C (97.2 °F) 04/05/23 1755   Pulse 95 04/05/23 1844   Resp 16 04/05/23 1840   SpO2 90 % 04/05/23 1844   Vitals shown include unvalidated device data.        Post Anesthesia Care and Evaluation    Patient location during evaluation: bedside  Patient participation: complete - patient participated  Level of consciousness: awake  Pain management: adequate    Airway patency: patent  PONV Status: none  Cardiovascular status: acceptable and stable  Respiratory status: acceptable  Hydration status: acceptable    Comments: An Anesthesiologist personally participated in the most demanding procedures (including induction and emergence if applicable) in the anesthesia plan, monitored the course of anesthesia administration at frequent intervals and remained physically present and available for immediate diagnosis and treatment of emergencies.

## 2023-04-05 NOTE — ANESTHESIA PREPROCEDURE EVALUATION
Anesthesia Evaluation     Patient summary reviewed and Nursing notes reviewed   no history of anesthetic complications:  NPO Solid Status: > 8 hours  NPO Liquid Status: > 2 hours           Airway   Mallampati: III  TM distance: >3 FB  Neck ROM: full  No difficulty expected  Dental - normal exam     Pulmonary - negative pulmonary ROS and normal exam    breath sounds clear to auscultation  Cardiovascular - negative cardio ROS and normal exam  Exercise tolerance: good (4-7 METS)    Rhythm: regular  Rate: normal        Neuro/Psych- negative ROS  GI/Hepatic/Renal/Endo - negative ROS     Musculoskeletal (-) negative ROS    Abdominal    Substance History - negative use     OB/GYN negative ob/gyn ROS         Other - negative ROS       ROS/Med Hx Other: PAT Nursing Notes unavailable.                   Anesthesia Plan    ASA 1     general     (Patient understands anesthesia not responsible for dental damage.)  intravenous induction     Anesthetic plan, risks, benefits, and alternatives have been provided, discussed and informed consent has been obtained with: patient.  Pre-procedure education provided  Use of blood products discussed with patient  Consented to blood products.   Plan discussed with CRNA.        CODE STATUS:    Code Status (Patient has no pulse and is not breathing): CPR (Attempt to Resuscitate)  Medical Interventions (Patient has pulse or is breathing): Full Support

## 2023-04-05 NOTE — LETTER
April 7, 2023     Patient: Jamil Deutsch   YOB: 1975   Date of Visit: 4/5/2023       To Whom It May Concern:    Jamil Deutsch was under the care of [facility] from 4/5/2023- 4/7/2023.   Medically cleared by his provider to return to work on 4/8/2023 with restriction including no lifting, pushing, pulling, or dragging anything over 10 pounds for 2 weeks.          Sincerely,

## 2023-04-05 NOTE — H&P
"History and Physical    Patient Name:  Jamil Deutsch  YOB: 1975  [id number]    Referring Provider: Dr. Be    Patient Care Team:  Keith Matos MD as PCP - General (Internal Medicine)    Reason for consult/Chief complaint:  abd pain     Subjective .     History of present illness:  Mr. Deutsch is a 47 year old gentleman who presented to the ED overnight with RLQ abdominal pain that started last night.  He denies fever, chills, nausea or vomiting.  He had a CT scan of the abdomen and pelvis that indicates he has an acute appendicitis.  His WBC is 20.       History:  History reviewed. No pertinent past medical history.    Past Surgical History:   Procedure Laterality Date   • NO PAST SURGERIES         Family History   Problem Relation Age of Onset   • No Known Problems Mother    • No Known Problems Father        Social History     Tobacco Use   • Smoking status: Never   Substance Use Topics   • Alcohol use: Never       Review of Systems:  A complete ROS was performed and all are negative except for what is documented in the HPI.    MEDS:  Prior to Admission medications    Not on File        ceFOXitin, 2 g, Intravenous, Q6H         lactated ringers, 100 mL/hr         Allergies:  Patient has no known allergies.    Objective         Physical Exam:      Constitutuional:  well nourished, no acute distress, appear stated age /89   Pulse 70   Temp 98.8 °F (37.1 °C)   Resp 17   Ht 188 cm (74\")   Wt 111 kg (243 lb 13.3 oz)   SpO2 90%   BMI 31.31 kg/m²    Eyes:  anicteric sclerae, moist conjunctivae, no lid lag, PERRLA  ENMT:  oropharynx clear, moist mucous membranes, good dentition  Neck:   full ROM, trachea midline, no thyromegaly  Cardiovascular: RRR, S1 and S2 present, no MRG's, heart rate 70, no pedal edema  Respiratory: lungs CTA, respirations even and unlabored  GI:  Abdomen soft, RLQ tender, nondistended, no HSM     Skin:  warm and dry, normal turgor, no rashes  Psychiatric:  alert and " oriented x3, intact judgement and insight, cooperative         Results Review: I have reviewed the patient's labs and imaging including CBC, BMP, CT of abd and pelvis    LABS/IMAGING:    WBC   Date Value Ref Range Status   04/05/2023 20.12 (H) 3.40 - 10.80 10*3/mm3 Final     RBC   Date Value Ref Range Status   04/05/2023 4.18 4.14 - 5.80 10*6/mm3 Final     Hemoglobin   Date Value Ref Range Status   04/05/2023 13.2 13.0 - 17.7 g/dL Final     Hematocrit   Date Value Ref Range Status   04/05/2023 38.1 37.5 - 51.0 % Final     MCV   Date Value Ref Range Status   04/05/2023 91.1 79.0 - 97.0 fL Final     MCH   Date Value Ref Range Status   04/05/2023 31.6 26.6 - 33.0 pg Final     MCHC   Date Value Ref Range Status   04/05/2023 34.6 31.5 - 35.7 g/dL Final     RDW   Date Value Ref Range Status   04/05/2023 11.9 (L) 12.3 - 15.4 % Final     RDW-SD   Date Value Ref Range Status   04/05/2023 40.0 37.0 - 54.0 fl Final     MPV   Date Value Ref Range Status   04/05/2023 10.0 6.0 - 12.0 fL Final     Platelets   Date Value Ref Range Status   04/05/2023 272 140 - 450 10*3/mm3 Final     Neutrophil %   Date Value Ref Range Status   04/05/2023 86.8 (H) 42.7 - 76.0 % Final     Lymphocyte %   Date Value Ref Range Status   04/05/2023 5.0 (L) 19.6 - 45.3 % Final     Monocyte %   Date Value Ref Range Status   04/05/2023 7.6 5.0 - 12.0 % Final     Eosinophil %   Date Value Ref Range Status   04/05/2023 0.0 (L) 0.3 - 6.2 % Final     Basophil %   Date Value Ref Range Status   04/05/2023 0.1 0.0 - 1.5 % Final     Immature Grans %   Date Value Ref Range Status   04/05/2023 0.5 0.0 - 0.5 % Final     Neutrophils, Absolute   Date Value Ref Range Status   04/05/2023 17.47 (H) 1.70 - 7.00 10*3/mm3 Final     Lymphocytes, Absolute   Date Value Ref Range Status   04/05/2023 1.00 0.70 - 3.10 10*3/mm3 Final     Monocytes, Absolute   Date Value Ref Range Status   04/05/2023 1.52 (H) 0.10 - 0.90 10*3/mm3 Final     Eosinophils, Absolute   Date Value Ref Range  Status   04/05/2023 0.00 0.00 - 0.40 10*3/mm3 Final     Basophils, Absolute   Date Value Ref Range Status   04/05/2023 0.03 0.00 - 0.20 10*3/mm3 Final     Immature Grans, Absolute   Date Value Ref Range Status   04/05/2023 0.10 (H) 0.00 - 0.05 10*3/mm3 Final     nRBC   Date Value Ref Range Status   04/05/2023 0.0 0.0 - 0.2 /100 WBC Final        Basic Metabolic Panel    Sodium Sodium   Date Value Ref Range Status   04/05/2023 134 (L) 136 - 145 mmol/L Final      Potassium Potassium   Date Value Ref Range Status   04/05/2023 3.9 3.5 - 5.2 mmol/L Final     Comment:     Slight hemolysis detected by analyzer. Results may be affected.      Chloride Chloride   Date Value Ref Range Status   04/05/2023 97 (L) 98 - 107 mmol/L Final      Bicarbonate No results found for: PLASMABICARB   BUN BUN   Date Value Ref Range Status   04/05/2023 11 6 - 20 mg/dL Final      Creatinine Creatinine   Date Value Ref Range Status   04/05/2023 1.09 0.76 - 1.27 mg/dL Final      Calcium Calcium   Date Value Ref Range Status   04/05/2023 9.3 8.6 - 10.5 mg/dL Final      Glucose      No components found for: GLUCOSE.*        Lab Results   Component Value Date    GLUCOSE 141 (H) 04/05/2023    BUN 11 04/05/2023    CREATININE 1.09 04/05/2023    BCR 10.1 04/05/2023    K 3.9 04/05/2023    CO2 25.9 04/05/2023    CALCIUM 9.3 04/05/2023    ALBUMIN 4.4 04/05/2023    AST 23 04/05/2023    ALT 19 04/05/2023          CT Abdomen Pelvis With Contrast    Result Date: 4/5/2023  PROCEDURE: CT ABDOMEN PELVIS W CONTRAST  COMPARISON: None.  INDICATIONS: MID ABDOMEN PAIN AND VOMITING.  TECHNIQUE: After obtaining the patient's consent, 664 CT images were created with non-ionic intravenous contrast material.   PROTOCOL:   Standard CT imaging protocol performed.    RADIATION:   Total DLP: 1,003.5 mGy*cm   Automated exposure control was utilized to minimize radiation dose. CONTRAST: 100 mL Isovue 370 I.V.  FINDINGS: The study is abnormal.  CT findings are most suggestive of  acute appendicitis.  No pneumoperitoneum or pneumatosis is seen.  No mechanical bowel obstruction.  The appendix is dilated with surrounding inflammation; it resides within the right iliac fossa.  It arises from the medial, inferior, and posterior aspects of the cecum at about the level of the superior right iliac crest and courses medially, inferiorly, and posteriorly into the right iliac fossa.  It has a maximum diameter of about 1.9 cm, as seen on image 86 of series 201 and adjacent images.  Minimal if any mural thickening involves the cecum and the terminal ileum.  There may be incidental tiny hepatic cysts, which measure about 1 cm less in size.  There is diastasis of the rectus sheath with fat and bowel protruding anteriorly through the diastatic defect, such as seen on image 71 of series 201 and adjacent images.  The diastatic defect measures 3.6 cm in transverse diameter.  No acute diverticulitis.  No renal/ureteral stones or hydronephrosis or obstructive uropathy.  No acute pyelonephritis.  There is chronic calcified granulomatous disease of the spleen.  There may be mild cardiomegaly.  Mild atelectasis involves the lung bases.  No acute infiltrate is seen.  There is slight motion artifact on the study.  Degenerative changes involve the imaged spine, especially the L5-S1 level.  Also, prominent facet osteoarthritis is present on the right at L2-3.  There may be partial congenital fusion of the facet joints on the left at L2-3.  Consider nonemergent lumbar spine MRI examination for further imaging assessment if clinically warranted.      Impression:  CT findings are most consistent with acute appendicitis.  No pneumoperitoneum.  No definite periappendiceal abscess is seen.     Please note that portions of this note were completed with a voice recognition program.  TORY MANSFIELD JR, MD       Electronically Signed and Approved By: TORY MANSFIELD JR, MD on 4/05/2023 at 5:59                     Assessment &  Plan         Acute appendicitis with localized peritonitis, without perforation, abscess, or gangrene    -NPO   -case request and consent for laparoscopic appendectomy possible open procedure by Dr. Berumen risks, benefits, alternatives discussed including bleeding, infection, damage to surrounding structures   -admit as outpatient in a bed   -Mefoxin 2 grams IV q 6 hours   -LR at 100 ml per hour   -SCD's   -zofran 4 mg IV q 6 hours prn n/v   -morphine 2 mg IV q 1 hours prn pain            Electronically signed by RENE Ring, 04/05/23, 8:54 AM EDT.

## 2023-04-05 NOTE — PLAN OF CARE
Goal Outcome Evaluation:               Pt is alert and oriented x4. Received pt as transfer from [facility] prior to surgery. Pain assessed and pt denies pain and pharmaceutical interventions. Pt educated by provider regarding appendectomy and consent was obtained. Pt currently still in recovery; awaiting report before return to floor. Care ongoing

## 2023-04-06 PROBLEM — Z90.49 S/P LAPAROSCOPIC APPENDECTOMY: Status: ACTIVE | Noted: 2023-04-06

## 2023-04-06 LAB
ANION GAP SERPL CALCULATED.3IONS-SCNC: 9.9 MMOL/L (ref 5–15)
BUN SERPL-MCNC: 18 MG/DL (ref 6–20)
BUN/CREAT SERPL: 14.4 (ref 7–25)
CALCIUM SPEC-SCNC: 8.9 MG/DL (ref 8.6–10.5)
CHLORIDE SERPL-SCNC: 104 MMOL/L (ref 98–107)
CO2 SERPL-SCNC: 27.1 MMOL/L (ref 22–29)
CREAT SERPL-MCNC: 1.25 MG/DL (ref 0.76–1.27)
DEPRECATED RDW RBC AUTO: 42.4 FL (ref 37–54)
EGFRCR SERPLBLD CKD-EPI 2021: 71.5 ML/MIN/1.73
ERYTHROCYTE [DISTWIDTH] IN BLOOD BY AUTOMATED COUNT: 12.1 % (ref 12.3–15.4)
GLUCOSE SERPL-MCNC: 112 MG/DL (ref 65–99)
HCT VFR BLD AUTO: 35.4 % (ref 37.5–51)
HGB BLD-MCNC: 11.9 G/DL (ref 13–17.7)
MAGNESIUM SERPL-MCNC: 1.8 MG/DL (ref 1.6–2.6)
MCH RBC QN AUTO: 31.8 PG (ref 26.6–33)
MCHC RBC AUTO-ENTMCNC: 33.6 G/DL (ref 31.5–35.7)
MCV RBC AUTO: 94.7 FL (ref 79–97)
PHOSPHATE SERPL-MCNC: 3.1 MG/DL (ref 2.5–4.5)
PLATELET # BLD AUTO: 233 10*3/MM3 (ref 140–450)
PMV BLD AUTO: 10.3 FL (ref 6–12)
POTASSIUM SERPL-SCNC: 4.1 MMOL/L (ref 3.5–5.2)
RBC # BLD AUTO: 3.74 10*6/MM3 (ref 4.14–5.8)
SODIUM SERPL-SCNC: 141 MMOL/L (ref 136–145)
WBC NRBC COR # BLD: 16.35 10*3/MM3 (ref 3.4–10.8)

## 2023-04-06 PROCEDURE — 83735 ASSAY OF MAGNESIUM: CPT | Performed by: SURGERY

## 2023-04-06 PROCEDURE — 99024 POSTOP FOLLOW-UP VISIT: CPT | Performed by: NURSE PRACTITIONER

## 2023-04-06 PROCEDURE — 25010000002 PIPERACILLIN SOD-TAZOBACTAM PER 1 G: Performed by: SURGERY

## 2023-04-06 PROCEDURE — 80048 BASIC METABOLIC PNL TOTAL CA: CPT | Performed by: SURGERY

## 2023-04-06 PROCEDURE — 85027 COMPLETE CBC AUTOMATED: CPT | Performed by: SURGERY

## 2023-04-06 PROCEDURE — 84100 ASSAY OF PHOSPHORUS: CPT | Performed by: SURGERY

## 2023-04-06 RX ADMIN — TAZOBACTAM SODIUM AND PIPERACILLIN SODIUM 3.38 G: 375; 3 INJECTION, SOLUTION INTRAVENOUS at 17:00

## 2023-04-06 RX ADMIN — SODIUM CHLORIDE, POTASSIUM CHLORIDE, SODIUM LACTATE AND CALCIUM CHLORIDE 75 ML/HR: 600; 310; 30; 20 INJECTION, SOLUTION INTRAVENOUS at 06:17

## 2023-04-06 RX ADMIN — HYDROCODONE BITARTRATE AND ACETAMINOPHEN 1 TABLET: 5; 325 TABLET ORAL at 10:05

## 2023-04-06 RX ADMIN — TAZOBACTAM SODIUM AND PIPERACILLIN SODIUM 3.38 G: 375; 3 INJECTION, SOLUTION INTRAVENOUS at 02:43

## 2023-04-06 RX ADMIN — SODIUM CHLORIDE, POTASSIUM CHLORIDE, SODIUM LACTATE AND CALCIUM CHLORIDE 75 ML/HR: 600; 310; 30; 20 INJECTION, SOLUTION INTRAVENOUS at 20:42

## 2023-04-06 RX ADMIN — PIPERACILLIN SODIUM AND TAZOBACTAM SODIUM 3.38 G: 3; .375 INJECTION, POWDER, LYOPHILIZED, FOR SOLUTION INTRAVENOUS at 23:26

## 2023-04-06 RX ADMIN — TAZOBACTAM SODIUM AND PIPERACILLIN SODIUM 3.38 G: 375; 3 INJECTION, SOLUTION INTRAVENOUS at 10:06

## 2023-04-06 NOTE — SIGNIFICANT NOTE
04/05/23 1230   Coping/Psychosocial   Observed Emotional State calm;cooperative   Verbalized Emotional State hopefulness   Trust Relationship/Rapport empathic listening provided   Family/Support Persons spouse   Involvement in Care interacting with patient   Additional Documentation Spiritual Care (Group)   Spiritual Care   Use of Spiritual Resources non-Tenriism use of spiritual care   Spiritual Care Source  initiative   Spiritual Care Follow-Up follow-up, none required as presently assessed   Response to Spiritual Care engaged in conversation;receptive of support;thanks expressed   Spiritual Care Interventions supportive conversation provided   Spiritual Care Visit Type initial   Receptivity to Spiritual Care visit welcomed;other (see comments)  (He is waiting for his appendix surgery.)

## 2023-04-06 NOTE — PLAN OF CARE
Goal Outcome Evaluation:               Pt Alert and oriented x4. Up ad shynan, ambulates in room and goes to toilet as needed without need for assistance. Requested pain medication just once this shift, states pain is very low. Administered IV abx as ordered. Surgical incisions held together with skin glue and show no s/s of infection or dehiscence. Pt states slight tenderness upon palpation but assessment otherwise WNL. Care ongoing

## 2023-04-06 NOTE — PLAN OF CARE
Goal Outcome Evaluation:  Plan of Care Reviewed With: patient        Progress: improving  Outcome Evaluation: patient has no complaints of pain throughout shift. IV abx given as ordered. tolerating diet well. VSS. instructed on importance of using IS.

## 2023-04-06 NOTE — PROGRESS NOTES
"POST OP PROGRESS NOTE     Patient Name:  Jamil Deutsch  YOB: 1975  [id number]   LOS: 1 day   1 Day Post-Op  Patient Care Team:  Keith Matos MD as PCP - General (Internal Medicine)          Subjective     Interval History:  VSS, afebrile, pain controlled, tolerating diet, WBC down to 16.       Review of Systems:    All complete review of systems was performed and all are negative except what is documented in the HPI.       Objective     Constitutuional:  well nourished, no acute distress, appear stated age /64 (BP Location: Left arm, Patient Position: Lying)   Pulse 71   Temp 98.6 °F (37 °C) (Oral)   Resp 16   Ht 188 cm (74.02\")   Wt 110 kg (242 lb 8.1 oz)   SpO2 94%   BMI 31.12 kg/m²    Eyes:  anicteric sclerae, moist conjunctivae, no lid lag, PERRLA  ENMT:  oropharynx clear, moist mucous membranes, good dentition  Neck:   full ROM, trachea midline, no thyromegaly  Cardiovascular: RRR, S1 and S2 present, no MRG's, heart rate 71,  no pedal edema  Respiratory: lungs CTA, respirations even and unlabored  GI:  Abdomen soft, appropriately tender, nondistended, no HSM     Skin:  warm and dry, normal turgor, no rashes  Psychiatric:  alert and oriented x3, intact judgement and insight, cooperative          Results Review:       I reviewed the patient's new clinical results including  CBC and BMP.     WBC   Date Value Ref Range Status   04/06/2023 16.35 (H) 3.40 - 10.80 10*3/mm3 Final     RBC   Date Value Ref Range Status   04/06/2023 3.74 (L) 4.14 - 5.80 10*6/mm3 Final     Hemoglobin   Date Value Ref Range Status   04/06/2023 11.9 (L) 13.0 - 17.7 g/dL Final     Hematocrit   Date Value Ref Range Status   04/06/2023 35.4 (L) 37.5 - 51.0 % Final     MCV   Date Value Ref Range Status   04/06/2023 94.7 79.0 - 97.0 fL Final     MCH   Date Value Ref Range Status   04/06/2023 31.8 26.6 - 33.0 pg Final     MCHC   Date Value Ref Range Status   04/06/2023 33.6 31.5 - 35.7 g/dL Final     RDW   Date Value " Ref Range Status   04/06/2023 12.1 (L) 12.3 - 15.4 % Final     RDW-SD   Date Value Ref Range Status   04/06/2023 42.4 37.0 - 54.0 fl Final     MPV   Date Value Ref Range Status   04/06/2023 10.3 6.0 - 12.0 fL Final     Platelets   Date Value Ref Range Status   04/06/2023 233 140 - 450 10*3/mm3 Final     Neutrophil %   Date Value Ref Range Status   04/05/2023 86.8 (H) 42.7 - 76.0 % Final     Lymphocyte %   Date Value Ref Range Status   04/05/2023 5.0 (L) 19.6 - 45.3 % Final     Monocyte %   Date Value Ref Range Status   04/05/2023 7.6 5.0 - 12.0 % Final     Eosinophil %   Date Value Ref Range Status   04/05/2023 0.0 (L) 0.3 - 6.2 % Final     Basophil %   Date Value Ref Range Status   04/05/2023 0.1 0.0 - 1.5 % Final     Immature Grans %   Date Value Ref Range Status   04/05/2023 0.5 0.0 - 0.5 % Final     Neutrophils, Absolute   Date Value Ref Range Status   04/05/2023 17.47 (H) 1.70 - 7.00 10*3/mm3 Final     Lymphocytes, Absolute   Date Value Ref Range Status   04/05/2023 1.00 0.70 - 3.10 10*3/mm3 Final     Monocytes, Absolute   Date Value Ref Range Status   04/05/2023 1.52 (H) 0.10 - 0.90 10*3/mm3 Final     Eosinophils, Absolute   Date Value Ref Range Status   04/05/2023 0.00 0.00 - 0.40 10*3/mm3 Final     Basophils, Absolute   Date Value Ref Range Status   04/05/2023 0.03 0.00 - 0.20 10*3/mm3 Final     Immature Grans, Absolute   Date Value Ref Range Status   04/05/2023 0.10 (H) 0.00 - 0.05 10*3/mm3 Final     nRBC   Date Value Ref Range Status   04/05/2023 0.0 0.0 - 0.2 /100 WBC Final         Basic Metabolic Panel    Sodium Sodium   Date Value Ref Range Status   04/06/2023 141 136 - 145 mmol/L Final   04/05/2023 134 (L) 136 - 145 mmol/L Final      Potassium Potassium   Date Value Ref Range Status   04/06/2023 4.1 3.5 - 5.2 mmol/L Final   04/05/2023 3.9 3.5 - 5.2 mmol/L Final     Comment:     Slight hemolysis detected by analyzer. Results may be affected.      Chloride Chloride   Date Value Ref Range Status    04/06/2023 104 98 - 107 mmol/L Final   04/05/2023 97 (L) 98 - 107 mmol/L Final      Bicarbonate No results found for: PLASMABICARB   BUN BUN   Date Value Ref Range Status   04/06/2023 18 6 - 20 mg/dL Final   04/05/2023 11 6 - 20 mg/dL Final      Creatinine Creatinine   Date Value Ref Range Status   04/06/2023 1.25 0.76 - 1.27 mg/dL Final   04/05/2023 1.09 0.76 - 1.27 mg/dL Final      Calcium Calcium   Date Value Ref Range Status   04/06/2023 8.9 8.6 - 10.5 mg/dL Final   04/05/2023 9.3 8.6 - 10.5 mg/dL Final      Glucose      No components found for: GLUCOSE.*       Lab Results   Component Value Date    GLUCOSE 112 (H) 04/06/2023    BUN 18 04/06/2023    CREATININE 1.25 04/06/2023    EGFR 71.5 04/06/2023    BCR 14.4 04/06/2023    K 4.1 04/06/2023    CO2 27.1 04/06/2023    CALCIUM 8.9 04/06/2023    ALBUMIN 4.4 04/05/2023    BILITOT 0.8 04/05/2023    AST 23 04/05/2023    ALT 19 04/05/2023       IMAGING:  Imaging Results (Last 72 Hours)     Procedure Component Value Units Date/Time    CT Abdomen Pelvis With Contrast [[id number]] Collected: 04/05/23 0559     Updated: 04/05/23 0602    Narrative:      PROCEDURE: CT ABDOMEN PELVIS W CONTRAST     COMPARISON: None.     INDICATIONS: MID ABDOMEN PAIN AND VOMITING.     TECHNIQUE: After obtaining the patient's consent, 664 CT images were created with non-ionic   intravenous contrast material.       PROTOCOL:   Standard CT imaging protocol performed.      RADIATION:   Total DLP: 1,003.5 mGy*cm    Automated exposure control was utilized to minimize radiation dose.   CONTRAST: 100 mL Isovue 370 I.V.     FINDINGS: The study is abnormal.  CT findings are most suggestive of acute appendicitis.  No   pneumoperitoneum or pneumatosis is seen.  No mechanical bowel obstruction.  The appendix is dilated   with surrounding inflammation; it resides within the right iliac fossa.  It arises from the medial,   inferior, and posterior aspects of the cecum at about the level of the superior right  iliac crest   and courses medially, inferiorly, and posteriorly into the right iliac fossa.  It has a maximum   diameter of about 1.9 cm, as seen on image 86 of series 201 and adjacent images.  Minimal if any   mural thickening involves the cecum and the terminal ileum.  There may be incidental tiny hepatic   cysts, which measure about 1 cm less in size.  There is diastasis of the rectus sheath with fat and   bowel protruding anteriorly through the diastatic defect, such as seen on image 71 of series 201   and adjacent images.  The diastatic defect measures 3.6 cm in transverse diameter.  No acute   diverticulitis.  No renal/ureteral stones or hydronephrosis or obstructive uropathy.  No acute   pyelonephritis.  There is chronic calcified granulomatous disease of the spleen.  There may be mild   cardiomegaly.  Mild atelectasis involves the lung bases.  No acute infiltrate is seen.  There is   slight motion artifact on the study.  Degenerative changes involve the imaged spine, especially the   L5-S1 level.  Also, prominent facet osteoarthritis is present on the right at L2-3.  There may be   partial congenital fusion of the facet joints on the left at L2-3.  Consider nonemergent lumbar   spine MRI examination for further imaging assessment if clinically warranted.       Impression:       CT findings are most consistent with acute appendicitis.  No pneumoperitoneum.  No   definite periappendiceal abscess is seen.             Please note that portions of this note were completed with a voice recognition program.     TORY MANSFIELD JR, MD         Electronically Signed and Approved By: TORY MANSFIELD JR, MD on 4/05/2023 at 5:59                              Medications:    Current Facility-Administered Medications:   •  acetaminophen (TYLENOL) tablet 500 mg, 500 mg, Oral, Q4H PRN, Baldev Berumen MD  •  diphenhydrAMINE (BENADRYL) capsule 25 mg, 25 mg, Oral, Q8H PRN, Baldev Berumen MD  •  HYDROcodone-acetaminophen  (NORCO) 5-325 MG per tablet 1 tablet, 1 tablet, Oral, Q4H PRN, Baldev Berumen MD, 1 tablet at 04/06/23 1005  •  ketorolac (TORADOL) injection 15 mg, 15 mg, Intravenous, Q6H PRN, Baldev Berumen MD, 30 mg at 04/05/23 1640  •  lactated ringers infusion, 75 mL/hr, Intravenous, Continuous, Baldev Berumen MD, Last Rate: 75 mL/hr at 04/06/23 0617, 75 mL/hr at 04/06/23 0617  •  morphine injection 2 mg, 2 mg, Intravenous, Q1H PRN, Baldev Berumen MD, 2 mg at 04/05/23 0822  •  ondansetron (ZOFRAN) injection 4 mg, 4 mg, Intravenous, Q6H PRN, Baldev Berumen MD, 4 mg at 04/05/23 1640  •  pantoprazole (PROTONIX) EC tablet 40 mg, 40 mg, Oral, BID PRN, Baldev Berumen MD  •  piperacillin-tazobactam (ZOSYN) 3.375 g in iso-osmotic dextrose 50 ml (premix), 3.375 g, Intravenous, Q8H, Baldev Berumen MD, 3.375 g at 04/06/23 1006  •  sodium chloride 0.9 % flush 10 mL, 10 mL, Intravenous, PRN, Baldev Berumen MD    Assessment & Plan       Acute appendicitis    Acute appendicitis with localized peritonitis, without perforation, abscess, or gangrene  S/P lap appy   -cont IV antibiotics   -CBC in AM   -ambulate in hallway QID        Electronically signed by Katlin Mayer, RENE, 04/06/23, 10:29 AM EDT.

## 2023-04-07 ENCOUNTER — TELEPHONE (OUTPATIENT)
Dept: SURGERY | Facility: CLINIC | Age: 48
End: 2023-04-07
Payer: COMMERCIAL

## 2023-04-07 VITALS
DIASTOLIC BLOOD PRESSURE: 81 MMHG | HEART RATE: 61 BPM | HEIGHT: 74 IN | BODY MASS INDEX: 31.12 KG/M2 | OXYGEN SATURATION: 97 % | TEMPERATURE: 98.6 F | WEIGHT: 242.51 LBS | SYSTOLIC BLOOD PRESSURE: 140 MMHG | RESPIRATION RATE: 16 BRPM

## 2023-04-07 DIAGNOSIS — K35.32 PERFORATED APPENDIX: Primary | ICD-10-CM

## 2023-04-07 LAB
BASOPHILS # BLD AUTO: 0.06 10*3/MM3 (ref 0–0.2)
BASOPHILS NFR BLD AUTO: 0.6 % (ref 0–1.5)
CYTO UR: NORMAL
DEPRECATED RDW RBC AUTO: 43.2 FL (ref 37–54)
EOSINOPHIL # BLD AUTO: 0.08 10*3/MM3 (ref 0–0.4)
EOSINOPHIL NFR BLD AUTO: 0.9 % (ref 0.3–6.2)
ERYTHROCYTE [DISTWIDTH] IN BLOOD BY AUTOMATED COUNT: 12.5 % (ref 12.3–15.4)
HCT VFR BLD AUTO: 36.4 % (ref 37.5–51)
HGB BLD-MCNC: 11.9 G/DL (ref 13–17.7)
IMM GRANULOCYTES # BLD AUTO: 0.03 10*3/MM3 (ref 0–0.05)
IMM GRANULOCYTES NFR BLD AUTO: 0.3 % (ref 0–0.5)
LAB AP CASE REPORT: NORMAL
LAB AP CLINICAL INFORMATION: NORMAL
LYMPHOCYTES # BLD AUTO: 2.15 10*3/MM3 (ref 0.7–3.1)
LYMPHOCYTES NFR BLD AUTO: 23.2 % (ref 19.6–45.3)
MCH RBC QN AUTO: 31.5 PG (ref 26.6–33)
MCHC RBC AUTO-ENTMCNC: 32.7 G/DL (ref 31.5–35.7)
MCV RBC AUTO: 96.3 FL (ref 79–97)
MONOCYTES # BLD AUTO: 0.9 10*3/MM3 (ref 0.1–0.9)
MONOCYTES NFR BLD AUTO: 9.7 % (ref 5–12)
NEUTROPHILS NFR BLD AUTO: 6.04 10*3/MM3 (ref 1.7–7)
NEUTROPHILS NFR BLD AUTO: 65.3 % (ref 42.7–76)
NRBC BLD AUTO-RTO: 0 /100 WBC (ref 0–0.2)
PATH REPORT.FINAL DX SPEC: NORMAL
PATH REPORT.GROSS SPEC: NORMAL
PLATELET # BLD AUTO: 224 10*3/MM3 (ref 140–450)
PMV BLD AUTO: 10.4 FL (ref 6–12)
RBC # BLD AUTO: 3.78 10*6/MM3 (ref 4.14–5.8)
WBC NRBC COR # BLD: 9.26 10*3/MM3 (ref 3.4–10.8)

## 2023-04-07 PROCEDURE — 85025 COMPLETE CBC W/AUTO DIFF WBC: CPT | Performed by: NURSE PRACTITIONER

## 2023-04-07 PROCEDURE — 25010000002 PIPERACILLIN SOD-TAZOBACTAM PER 1 G: Performed by: SURGERY

## 2023-04-07 PROCEDURE — 99024 POSTOP FOLLOW-UP VISIT: CPT | Performed by: NURSE PRACTITIONER

## 2023-04-07 RX ORDER — POLYETHYLENE GLYCOL 3350 17 G/17G
17 POWDER, FOR SOLUTION ORAL DAILY
Qty: 5 PACKET | Refills: 0 | Status: SHIPPED | OUTPATIENT
Start: 2023-04-07 | End: 2023-04-12

## 2023-04-07 RX ORDER — AMOXICILLIN AND CLAVULANATE POTASSIUM 875; 125 MG/1; MG/1
1 TABLET, FILM COATED ORAL 2 TIMES DAILY
Qty: 14 TABLET | Refills: 0 | Status: SHIPPED | OUTPATIENT
Start: 2023-04-07 | End: 2023-04-14

## 2023-04-07 RX ORDER — HYDROCODONE BITARTRATE AND ACETAMINOPHEN 5; 325 MG/1; MG/1
1 TABLET ORAL EVERY 4 HOURS PRN
Qty: 18 TABLET | Refills: 0 | Status: SHIPPED | OUTPATIENT
Start: 2023-04-07 | End: 2023-04-10

## 2023-04-07 RX ADMIN — PIPERACILLIN SODIUM AND TAZOBACTAM SODIUM 3.38 G: 3; .375 INJECTION, POWDER, LYOPHILIZED, FOR SOLUTION INTRAVENOUS at 08:05

## 2023-04-07 NOTE — DISCHARGE SUMMARY
Date of Admission:  4/5/2023    Date of Discharge:  4/7/2023    Discharge Diagnosis:   Acute appendicitis with localized peritonitis, without perforation, abscess, or gangrene [K35.30]  Nausea and vomiting, unspecified vomiting type [R11.2]  Acute appendicitis [K35.80]   S/P laparoscopic appendectomy  Post-Op Diagnosis Codes:     * Acute appendicitis with localized peritonitis, without perforation, abscess, or gangrene [K35.30]       Presenting Problem/History of Present Illness  Active Hospital Problems    Diagnosis  POA   • **Acute appendicitis [K35.80]  Yes   • S/P laparoscopic appendectomy [Z90.49]  Not Applicable   • Acute appendicitis with localized peritonitis, without perforation, abscess, or gangrene [K35.30]  Yes      Resolved Hospital Problems   No resolved problems to display.          Hospital Course   Lexii Deutsch is a 47 y.o. male presented to the ED at [facility] with RLQ abdominal pain.  He had a CT scan that indicated he has an acute appendicitis. He was taken to surgery where he underwent a laparoscopic appendectomy.  He was admitted after the procedure for routine postoperative care and IV antibiotics.  Upon discharge to home he is tolerating a regular diet and  His pain is controlled.   Procedures Performed    Procedure(s):  APPENDECTOMY LAPAROSCOPIC  -------------------       Consults:   Consults     Date and Time Order Name Status Description    4/5/2023  6:32 AM Surgery (on-call MD unless specified)            Condition on Discharge:  stable    Vital Signs  Temp:  [98.3 °F (36.8 °C)-98.8 °F (37.1 °C)] 98.4 °F (36.9 °C)  Heart Rate:  [63-84] 63  Resp:  [16-18] 16  BP: (116-139)/(62-82) 139/81    Physical Exam:     Discharge Disposition  Home or Self Care    Discharge Medications     Discharge Medications      New Medications      Instructions Start Date   HYDROcodone-acetaminophen 5-325 MG per tablet  Commonly known as: NORCO   1 tablet, Oral, Every 4 Hours PRN             Discharge Diet:   Diet  Instructions     Diet: Regular/House Diet; Regular Texture (IDDSI 7); Thin (IDDSI 0)      Discharge Diet: Regular/House Diet    Texture: Regular Texture (IDDSI 7)    Fluid Consistency: Thin (IDDSI 0)          Activity at Discharge:   Activity Instructions     Bathing Restrictions      Ok to shower    Type of Restriction: Bathing    Bathing Restrictions: No Tub Bath    Driving Restrictions      Type of Restriction: Driving    Driving Restrictions: No Driving While Taking Narcotics    Lifting Restrictions      Type of Restriction: Lifting    Lifting Restrictions: Lifting Restriction (Indicate Limit)    Weight Limit (Pounds): 10    Length of Lifting Restriction: 2 weeks    Work Restrictions      Type of Restriction: Work    May Return to Work: Immediately    With / Without Restrictions: With Restrictions    Explain Work Restrictions: no lifting, pushing, pulling, or dragging anything over 10 pounds for 2 weeks          Follow-up Appointments  No future appointments.  Additional Instructions for the Follow-ups that You Need to Schedule     Call MD With Problems / Concerns   As directed      Instructions: call if you develop a fever, chills, body aches, nausea/vomiting, abdominal pain uncontrolled with pain medication    Order Comments: Instructions: call if you develop a fever, chills, body aches, nausea/vomiting, abdominal pain uncontrolled with pain medication          Discharge Follow-up with Specialty: Dr Berumen in 2 weeks   As directed      Specialty: Dr Berumen in 2 weeks               Test Results Pending at Discharge  Pending Labs     Order Current Status    Tissue Pathology Exam In process           RENE Ring  04/07/23  09:26 EDT        Electronically signed by RENE Ring, 04/07/23, 9:27 AM EDT.

## 2023-04-07 NOTE — PLAN OF CARE
Goal Outcome Evaluation:  Plan of Care Reviewed With: patient        Progress: improving       VSS, room air. C/o incision soreness, no intervention per patient request. Up adlib, ambulated in perez and room. Passing gas. WBC improved this morning. Plan to d/c home today. Patient agreeable to plan. Education and written material provided. Will CTM and provide care.

## 2023-04-07 NOTE — PROGRESS NOTES
"POST OP PROGRESS NOTE     Patient Name:  Jamil Deutsch  YOB: 1975  [id number]   LOS: 1 day   2 Days Post-Op  Patient Care Team:  Keith Matos MD as PCP - General (Internal Medicine)          Subjective     Interval History:  VSS, afebrile. Tolerating diet. Pain controlled, WBC 9.      Review of Systems:    All complete review of systems was performed and all are negative except what is documented in the HPI.       Objective     Constitutuional:  well nourished, no acute distress, appear stated age /81 (BP Location: Left arm, Patient Position: Sitting)   Pulse 63   Temp 98.4 °F (36.9 °C) (Oral)   Resp 16   Ht 188 cm (74.02\")   Wt 110 kg (242 lb 8.1 oz)   SpO2 96%   BMI 31.12 kg/m²    Eyes:  anicteric sclerae, moist conjunctivae, no lid lag, PERRLA  ENMT:  oropharynx clear, moist mucous membranes, good dentition  Neck:   full ROM, trachea midline, no thyromegaly  Cardiovascular: RRR, S1 and S2 present, no MRG's, heart rate 63, no pedal edema  Respiratory: lungs CTA, respirations even and unlabored  GI:  Abdomen soft, appropriately tender, nondistended, no HSM     Skin:  warm and dry, normal turgor, no rashes  Psychiatric:  alert and oriented x3, intact judgement and insight, cooperative          Results Review:       I reviewed the patient's new clinical results including  CBC and BMP.     WBC   Date Value Ref Range Status   04/07/2023 9.26 3.40 - 10.80 10*3/mm3 Final     RBC   Date Value Ref Range Status   04/07/2023 3.78 (L) 4.14 - 5.80 10*6/mm3 Final     Hemoglobin   Date Value Ref Range Status   04/07/2023 11.9 (L) 13.0 - 17.7 g/dL Final     Hematocrit   Date Value Ref Range Status   04/07/2023 36.4 (L) 37.5 - 51.0 % Final     MCV   Date Value Ref Range Status   04/07/2023 96.3 79.0 - 97.0 fL Final     MCH   Date Value Ref Range Status   04/07/2023 31.5 26.6 - 33.0 pg Final     MCHC   Date Value Ref Range Status   04/07/2023 32.7 31.5 - 35.7 g/dL Final     RDW   Date Value Ref Range " Status   04/07/2023 12.5 12.3 - 15.4 % Final     RDW-SD   Date Value Ref Range Status   04/07/2023 43.2 37.0 - 54.0 fl Final     MPV   Date Value Ref Range Status   04/07/2023 10.4 6.0 - 12.0 fL Final     Platelets   Date Value Ref Range Status   04/07/2023 224 140 - 450 10*3/mm3 Final     Neutrophil %   Date Value Ref Range Status   04/07/2023 65.3 42.7 - 76.0 % Final     Lymphocyte %   Date Value Ref Range Status   04/07/2023 23.2 19.6 - 45.3 % Final     Monocyte %   Date Value Ref Range Status   04/07/2023 9.7 5.0 - 12.0 % Final     Eosinophil %   Date Value Ref Range Status   04/07/2023 0.9 0.3 - 6.2 % Final     Basophil %   Date Value Ref Range Status   04/07/2023 0.6 0.0 - 1.5 % Final     Immature Grans %   Date Value Ref Range Status   04/07/2023 0.3 0.0 - 0.5 % Final     Neutrophils, Absolute   Date Value Ref Range Status   04/07/2023 6.04 1.70 - 7.00 10*3/mm3 Final     Lymphocytes, Absolute   Date Value Ref Range Status   04/07/2023 2.15 0.70 - 3.10 10*3/mm3 Final     Monocytes, Absolute   Date Value Ref Range Status   04/07/2023 0.90 0.10 - 0.90 10*3/mm3 Final     Eosinophils, Absolute   Date Value Ref Range Status   04/07/2023 0.08 0.00 - 0.40 10*3/mm3 Final     Basophils, Absolute   Date Value Ref Range Status   04/07/2023 0.06 0.00 - 0.20 10*3/mm3 Final     Immature Grans, Absolute   Date Value Ref Range Status   04/07/2023 0.03 0.00 - 0.05 10*3/mm3 Final     nRBC   Date Value Ref Range Status   04/07/2023 0.0 0.0 - 0.2 /100 WBC Final         Basic Metabolic Panel    Sodium Sodium   Date Value Ref Range Status   04/06/2023 141 136 - 145 mmol/L Final   04/05/2023 134 (L) 136 - 145 mmol/L Final      Potassium Potassium   Date Value Ref Range Status   04/06/2023 4.1 3.5 - 5.2 mmol/L Final   04/05/2023 3.9 3.5 - 5.2 mmol/L Final     Comment:     Slight hemolysis detected by analyzer. Results may be affected.      Chloride Chloride   Date Value Ref Range Status   04/06/2023 104 98 - 107 mmol/L Final    04/05/2023 97 (L) 98 - 107 mmol/L Final      Bicarbonate No results found for: PLASMABICARB   BUN BUN   Date Value Ref Range Status   04/06/2023 18 6 - 20 mg/dL Final   04/05/2023 11 6 - 20 mg/dL Final      Creatinine Creatinine   Date Value Ref Range Status   04/06/2023 1.25 0.76 - 1.27 mg/dL Final   04/05/2023 1.09 0.76 - 1.27 mg/dL Final      Calcium Calcium   Date Value Ref Range Status   04/06/2023 8.9 8.6 - 10.5 mg/dL Final   04/05/2023 9.3 8.6 - 10.5 mg/dL Final      Glucose      No components found for: GLUCOSE.*       Lab Results   Component Value Date    GLUCOSE 112 (H) 04/06/2023    BUN 18 04/06/2023    CREATININE 1.25 04/06/2023    EGFR 71.5 04/06/2023    BCR 14.4 04/06/2023    K 4.1 04/06/2023    CO2 27.1 04/06/2023    CALCIUM 8.9 04/06/2023    ALBUMIN 4.4 04/05/2023    BILITOT 0.8 04/05/2023    AST 23 04/05/2023    ALT 19 04/05/2023       IMAGING:  Imaging Results (Last 72 Hours)     Procedure Component Value Units Date/Time    CT Abdomen Pelvis With Contrast [[id number]] Collected: 04/05/23 0559     Updated: 04/05/23 0602    Narrative:      PROCEDURE: CT ABDOMEN PELVIS W CONTRAST     COMPARISON: None.     INDICATIONS: MID ABDOMEN PAIN AND VOMITING.     TECHNIQUE: After obtaining the patient's consent, 664 CT images were created with non-ionic   intravenous contrast material.       PROTOCOL:   Standard CT imaging protocol performed.      RADIATION:   Total DLP: 1,003.5 mGy*cm    Automated exposure control was utilized to minimize radiation dose.   CONTRAST: 100 mL Isovue 370 I.V.     FINDINGS: The study is abnormal.  CT findings are most suggestive of acute appendicitis.  No   pneumoperitoneum or pneumatosis is seen.  No mechanical bowel obstruction.  The appendix is dilated   with surrounding inflammation; it resides within the right iliac fossa.  It arises from the medial,   inferior, and posterior aspects of the cecum at about the level of the superior right iliac crest   and courses medially,  inferiorly, and posteriorly into the right iliac fossa.  It has a maximum   diameter of about 1.9 cm, as seen on image 86 of series 201 and adjacent images.  Minimal if any   mural thickening involves the cecum and the terminal ileum.  There may be incidental tiny hepatic   cysts, which measure about 1 cm less in size.  There is diastasis of the rectus sheath with fat and   bowel protruding anteriorly through the diastatic defect, such as seen on image 71 of series 201   and adjacent images.  The diastatic defect measures 3.6 cm in transverse diameter.  No acute   diverticulitis.  No renal/ureteral stones or hydronephrosis or obstructive uropathy.  No acute   pyelonephritis.  There is chronic calcified granulomatous disease of the spleen.  There may be mild   cardiomegaly.  Mild atelectasis involves the lung bases.  No acute infiltrate is seen.  There is   slight motion artifact on the study.  Degenerative changes involve the imaged spine, especially the   L5-S1 level.  Also, prominent facet osteoarthritis is present on the right at L2-3.  There may be   partial congenital fusion of the facet joints on the left at L2-3.  Consider nonemergent lumbar   spine MRI examination for further imaging assessment if clinically warranted.       Impression:       CT findings are most consistent with acute appendicitis.  No pneumoperitoneum.  No   definite periappendiceal abscess is seen.             Please note that portions of this note were completed with a voice recognition program.     TORY MANSFIELD JR, MD         Electronically Signed and Approved By: TORY MANSFIELD JR, MD on 4/05/2023 at 5:59                              Medications:    Current Facility-Administered Medications:   •  acetaminophen (TYLENOL) tablet 500 mg, 500 mg, Oral, Q4H PRN, Baldev Berumen MD  •  diphenhydrAMINE (BENADRYL) capsule 25 mg, 25 mg, Oral, Q8H PRN, Baldev Berumen MD  •  HYDROcodone-acetaminophen (NORCO) 5-325 MG per tablet 1 tablet, 1  tablet, Oral, Q4H PRN, Baldev Berumen MD, 1 tablet at 04/06/23 1005  •  ketorolac (TORADOL) injection 15 mg, 15 mg, Intravenous, Q6H PRN, Baldev Berumen MD, 30 mg at 04/05/23 1640  •  lactated ringers infusion, 75 mL/hr, Intravenous, Continuous, Baldev Berumen MD, Last Rate: 75 mL/hr at 04/06/23 2042, 75 mL/hr at 04/06/23 2042  •  morphine injection 2 mg, 2 mg, Intravenous, Q1H PRN, Baldev Berumen MD, 2 mg at 04/05/23 0822  •  ondansetron (ZOFRAN) injection 4 mg, 4 mg, Intravenous, Q6H PRN, Baldev Berumen MD, 4 mg at 04/05/23 1640  •  pantoprazole (PROTONIX) EC tablet 40 mg, 40 mg, Oral, BID PRN, Baldev Berumen MD  •  piperacillin-tazobactam (ZOSYN) 3.375 g/100 mL 0.9% NS IVPB (mbp), 3.375 g, Intravenous, Q8H, Baldev Berumen MD, 3.375 g at 04/07/23 0805  •  sodium chloride 0.9 % flush 10 mL, 10 mL, Intravenous, PRN, Baldev Berumen MD    Assessment & Plan       Acute appendicitis    Acute appendicitis with localized peritonitis, without perforation, abscess, or gangrene    S/P laparoscopic appendectomy      DC home          Electronically signed by RENE Ring, 04/07/23, 9:22 AM EDT.

## 2023-04-07 NOTE — TELEPHONE ENCOUNTER
----- Message from Baldev Berumen MD sent at 4/7/2023 12:41 PM EDT -----  Patient is in the hospital with appendicitis but is going home today.  He may already have left the hospital.  Anyhow, tell him I just received the pathology report and it did show the appendix was perforated so I want him to take 7 days of Augmentin 875 twice daily.  Please send in a prescription for him and tell him to start taking it after he gets home today.

## 2023-04-23 NOTE — PROGRESS NOTES
Patient is here for follow-up after lap appendectomy on 4/5/2023 for acute appendicitis.  There was inflammatory exudate on the appendix and a possible small perforation at the tip of the appendix.  Pathology port indicated there was no evidence of appendiceal perforation.    Patient has no significant complaints or concerns.    Abdominal exam is benign and the incisions are all healing well.    My assessment is the patient is recovering satisfactorily after surgery.  No new issues to address.  Patient seems pleased with outcome thus far and can see me PRN.

## 2023-04-24 ENCOUNTER — OFFICE VISIT (OUTPATIENT)
Dept: SURGERY | Facility: CLINIC | Age: 48
End: 2023-04-24
Payer: COMMERCIAL

## 2023-04-24 VITALS — BODY MASS INDEX: 31.44 KG/M2 | WEIGHT: 245 LBS | HEIGHT: 74 IN | RESPIRATION RATE: 16 BRPM

## 2023-04-24 DIAGNOSIS — Z09 ENCOUNTER FOR FOLLOW-UP: Primary | ICD-10-CM

## 2023-04-24 PROCEDURE — 99024 POSTOP FOLLOW-UP VISIT: CPT | Performed by: SURGERY

## (undated) DEVICE — 3 RING SUTURE PASSER - 16 CM: Brand: 3 RING SUTURE PASSER - 16 CM

## (undated) DEVICE — SUT VIC PLS CTD BR 0 TIE 18IN VIL

## (undated) DEVICE — GLV SURG BIOGEL LTX PF 7 1/2

## (undated) DEVICE — GENERAL LAPAROSCOPY-LF: Brand: MEDLINE INDUSTRIES, INC.

## (undated) DEVICE — INTENDED FOR TISSUE SEPARATION, AND OTHER PROCEDURES THAT REQUIRE A SHARP SURGICAL BLADE TO PUNCTURE OR CUT.: Brand: BARD-PARKER ® CARBON RIB-BACK BLADES

## (undated) DEVICE — LAPAROVUE VISIBILITY SYSTEM LAPAROSCOPIC SOLUTIONS: Brand: LAPAROVUE

## (undated) DEVICE — LAPAROSCOPIC TROCAR SLEEVE/SINGLE USE: Brand: KII® OPTICAL ACCESS SYSTEM

## (undated) DEVICE — TISSUE RETRIEVAL SYSTEM: Brand: INZII RETRIEVAL SYSTEM

## (undated) DEVICE — GOWN,REINFORCE,POLY,SIRUS,BREATH SLV,XLG: Brand: MEDLINE

## (undated) DEVICE — ECHELON FLEX 60 ARTICULATING ENDOSCOPIC LINEAR CUTTER (NO CARTRIDGE): Brand: ECHELON FLEX ENDOPATH

## (undated) DEVICE — 2, DISPOSABLE SUCTION/IRRIGATOR WITHOUT DISPOSABLE TIP: Brand: STRYKEFLOW

## (undated) DEVICE — SLV SCD KN/LEN ADJ EXPRSS BLENDED MD 1P/U

## (undated) DEVICE — SUT MNCRYL 4/0 PS2 18 IN

## (undated) DEVICE — ADHS SKIN SURG TISS VISC PREMIERPRO EXOFIN HI/VISC FAST/DRY

## (undated) DEVICE — SOL IRR NACL 0.9PCT BT 1000ML

## (undated) DEVICE — ENDOPATH XCEL WITH OPTIVIEW TECHNOLOGY BLADELESS TROCARS WITH STABILITY SLEEVES: Brand: ENDOPATH XCEL OPTIVIEW

## (undated) DEVICE — LAPAROSCOPIC DISSECTOR: Brand: DEROYAL

## (undated) DEVICE — TROCAR: Brand: KII® SLEEVE

## (undated) DEVICE — ENDOPATH PNEUMONEEDLE INSUFFLATION NEEDLES WITH LUER LOCK CONNECTORS 120MM: Brand: ENDOPATH

## (undated) DEVICE — MARYLAND JAW LAPAROSCOPIC SEALER/DIVIDER COATED: Brand: LIGASURE

## (undated) DEVICE — LAP PORT CLOSURE GUIDES 5MM AND 10/12MM: Brand: LAP PORT CLOSURE GUIDES 5MM AND 10/12MM